# Patient Record
Sex: MALE | Race: BLACK OR AFRICAN AMERICAN
[De-identification: names, ages, dates, MRNs, and addresses within clinical notes are randomized per-mention and may not be internally consistent; named-entity substitution may affect disease eponyms.]

---

## 2017-10-07 NOTE — ED
Bk GOLD Nikita, scribed for Levon Villareal MD on 10/07/17 at 1348 .





HPI Cardiac





- HPI Summary


HPI Summary: 





This patient is a 35 year old M presenting to ED with a chief complaint of 

intense coughing since 1 week ago. The CC is described as increased in 

intensity since last week, productive clear sputum. The patient rates the pain 8

/10 in severity. Symptoms aggravated by nothing. Symptoms alleviated by 

nothing. Patient reports dry cough (10 years), diaphoresis, hemoptysis, 

coughing spasms, felt a pop on my left side of chest, dyspnea, SOB, wheezing, 

clear productive cough, sore throat, abdominal pain, and rashes. Patient denies 

fever and ear ache. Pt went to [facility] care 3 weeks ago. He received a call 

from them 1 week ago, explaining that he might have contracted whooping cough.





- History of Current Complaint


Chief Complaint: EDChestPainROMI


Stated Complaint: SHORT OF BREATH COUGH


Time Seen by Provider: 10/07/17 13:33


Hx Obtained From: Patient


Onset/Duration: Started Weeks Ago - 1 week ago, coughing became more intense, 

Still Present


Timing: Constant, Lasting Weeks


Initial Severity: Moderate


Current Severity: Moderate


Pain Intensity: 8


Pain Scale Used: 0-10 Numeric


Aggravating Factor(s): Nothing


Alleviating Factor(s): Nothing


Associated Signs and Symptoms: Positive: Other: - intense coughing since 1 week 

ago. . Patient reports dry cough (10 years), diaphoresis, hemoptysis, coughing 

spasms, felt a pop on my left side of chest, dyspnea, SOB, wheezing, clear 

productive cough, sore throat, abdominal pain, and rashes. Patient denies fever 

and ear ache.





- Allergy/Home Medications


Allergies/Adverse Reactions: 


 Allergies











Allergy/AdvReac Type Severity Reaction Status Date / Time


 


No Known Drug Allergy Allergy  Unknown Verified 10/07/17 14:50





   Reaction  





   Details  


 


Environmental Allergy  Difficulty Uncoded 10/07/17 14:51





   Breathing/Wheezing  














PMH/Surg Hx/FS Hx/Imm Hx


Endocrine/Hematology History: 


   Denies: Hx Diabetes


Cardiovascular History: 


   Denies: Hx Coronary Artery Disease, Hx Hypertension


Infectious Disease History: No


Infectious Disease History: 


   Denies: Traveled Outside the [place] in Last 30 Days





- Family History


Known Family History: Positive: Hypertension


   Negative: Cardiac Disease, Diabetes





- Social History


Alcohol Use: None


Substance Use Type: Reports: None


Smoking Status (MU): Never Smoked Tobacco





Review of Systems


Positive: Chills, Skin Diaphoresis.  Negative: Fever


Positive: Sore Throat, Other.  Negative: Ear Ache


Positive: Shortness Of Breath, Cough - dry cough for 10 years; clear, 

productive cough since last week, coughing spasms, felt a pop on my left side 

of chest, Other - wheezing, dyspnea


Negative: Abdominal Pain


Negative: Rash


All Other Systems Reviewed And Are Negative: Yes





Physical Exam


Triage Information Reviewed: Yes


Vital Signs On Initial Exam: 


 Initial Vitals











Temp Pulse Resp BP Pulse Ox


 


 98.6 F   82   18   130/87   98 


 


 10/07/17 11:24  10/07/17 11:24  10/07/17 11:24  10/07/17 11:24  10/07/17 11:24











Vital Signs Reviewed: Yes


Appearance: Positive: No Pain Distress, Ill-Appearing - mild


Skin: Positive: Warm, Skin Color Reflects Adequate Perfusion, Dry


Head/Face: Positive: Normal Head/Face Inspection


Eyes: Positive: EOMI, AICHA


ENT: Positive: Other - cerumen impaction bilaterally in the ears


Neck: Positive: Supple, Nontender


Respiratory/Lung Sounds: Positive: Clear to Auscultation, Breath Sounds Present


Cardiovascular: Positive: RRR


Abdomen Description: Positive: Nontender, Soft


Bowel Sounds: Positive: Present


Musculoskeletal: Positive: Normal, Strength/ROM Intact


Neurological: Positive: Normal, Sensory/Motor Intact, Alert, Oriented to Person 

Place, Time


Psychiatric: Positive: Affect/Mood Appropriate





- Stuart Coma Scale


Coma Scale Total: 15





Diagnostics





- Vital Signs


 Vital Signs











  Temp Pulse Resp BP Pulse Ox


 


 10/07/17 13:00   77   133/89  96


 


 10/07/17 12:52   80    98


 


 10/07/17 12:51     137/109 


 


 10/07/17 12:27  98.7 F  77  22  131/78  100


 


 10/07/17 11:24  98.6 F  82  18  130/87  98














- Laboratory


Lab Results: 


 Lab Results











  10/07/17 10/07/17 10/07/17 Range/Units





  14:03 14:03 14:03 


 


WBC     (3.5-10.8)  10^3/ul


 


RBC     (4.0-5.4)  10^6/ul


 


Hgb     (14.0-18.0)  g/dl


 


Hct     (42-52)  %


 


MCV     (80-94)  fL


 


MCH     (27-31)  pg


 


MCHC     (31-36)  g/dl


 


RDW     (10.5-15)  %


 


Plt Count     (150-450)  10^3/ul


 


MPV     (7.4-10.4)  um3


 


Neut % (Auto)     (38-83)  %


 


Lymph % (Auto)     (25-47)  %


 


Mono % (Auto)     (1-9)  %


 


Eos % (Auto)     (0-6)  %


 


Baso % (Auto)     (0-2)  %


 


Absolute Neuts (auto)     (1.5-7.7)  10^3/ul


 


Absolute Lymphs (auto)     (1.0-4.8)  10^3/ul


 


Absolute Monos (auto)     (0-0.8)  10^3/ul


 


Absolute Eos (auto)     (0-0.6)  10^3/ul


 


Absolute Basos (auto)     (0-0.2)  10^3/ul


 


Absolute Nucleated RBC     10^3/ul


 


Nucleated RBC %     


 


INR (Anticoag Therapy)  0.97    (0.89-1.11)  


 


APTT  23.6 L    (26.0-36.3)  seconds


 


D-Dimer, Quantitative  < 200    (Less Than 230)  ng/mL


 


Sodium    138  (133-145)  mmol/L


 


Potassium    3.5  (3.5-5.0)  mmol/L


 


Chloride    104  (101-111)  mmol/L


 


Carbon Dioxide    26  (22-32)  mmol/L


 


Anion Gap    8  (2-11)  mmol/L


 


BUN    11  (6-24)  mg/dL


 


Creatinine    1.11  (0.67-1.17)  mg/dL


 


Est GFR ( Amer)    98.7  (>60)  


 


Est GFR (Non-Af Amer)    76.8  (>60)  


 


BUN/Creatinine Ratio    9.9  (8-20)  


 


Glucose    87  ()  mg/dL


 


Lactic Acid     (0.5-2.0)  mmol/L


 


Calcium    9.0  (8.6-10.3)  mg/dL


 


Magnesium    2.1  (1.9-2.7)  mg/dL


 


Total Bilirubin    0.50  (0.2-1.0)  mg/dL


 


AST    18  (13-39)  U/L


 


ALT    23  (7-52)  U/L


 


Alkaline Phosphatase    46  ()  U/L


 


Troponin I    0.01  (<0.04)  ng/mL


 


C-Reactive Protein    7.00 H  (< 5.00)  mg/L


 


B-Natriuretic Peptide   34  ( - 100) pg/mL


 


Total Protein    7.1  (6.4-8.9)  g/dL


 


Albumin    4.0  (3.2-5.2)  g/dL


 


Globulin    3.1  (2-4)  g/dL


 


Albumin/Globulin Ratio    1.3  (1-3)  


 


Lipase    27  (11.0-82.0)  U/L


 


TSH    1.25  (0.34-5.60)  mcIU/mL


 


Urine Color     


 


Urine Appearance     


 


Urine pH     (5-9)  


 


Ur Specific Gravity     (1.010-1.030)  


 


Urine Protein     (Negative)  


 


Urine Ketones     (Negative)  


 


Urine Blood     (Negative)  


 


Urine Nitrate     (Negative)  


 


Urine Bilirubin     (Negative)  


 


Urine Urobilinogen     (Negative)  


 


Ur Leukocyte Esterase     (Negative)  


 


Urine WBC (Auto)     (Absent)  


 


Urine RBC (Auto)     (Absent)  


 


Ur Squamous Epith Cells     (Absent)  


 


Urine Bacteria     (Absent)  


 


Urine Glucose     (Negative)  


 


Urine Ascorbic Acid     (Negative)  














  10/07/17 10/07/17 10/07/17 Range/Units





  14:03 14:03 14:23 


 


WBC  14.0 H    (3.5-10.8)  10^3/ul


 


RBC  4.53    (4.0-5.4)  10^6/ul


 


Hgb  13.6 L    (14.0-18.0)  g/dl


 


Hct  40 L    (42-52)  %


 


MCV  88    (80-94)  fL


 


MCH  30    (27-31)  pg


 


MCHC  34    (31-36)  g/dl


 


RDW  14    (10.5-15)  %


 


Plt Count  280    (150-450)  10^3/ul


 


MPV  8    (7.4-10.4)  um3


 


Neut % (Auto)  56.9    (38-83)  %


 


Lymph % (Auto)  32.8    (25-47)  %


 


Mono % (Auto)  8.8    (1-9)  %


 


Eos % (Auto)  0.3    (0-6)  %


 


Baso % (Auto)  1.2    (0-2)  %


 


Absolute Neuts (auto)  8.0 H    (1.5-7.7)  10^3/ul


 


Absolute Lymphs (auto)  4.6    (1.0-4.8)  10^3/ul


 


Absolute Monos (auto)  1.2 H    (0-0.8)  10^3/ul


 


Absolute Eos (auto)  0    (0-0.6)  10^3/ul


 


Absolute Basos (auto)  0.2    (0-0.2)  10^3/ul


 


Absolute Nucleated RBC  0.02    10^3/ul


 


Nucleated RBC %  0.1    


 


INR (Anticoag Therapy)     (0.89-1.11)  


 


APTT     (26.0-36.3)  seconds


 


D-Dimer, Quantitative     (Less Than 230)  ng/mL


 


Sodium     (133-145)  mmol/L


 


Potassium     (3.5-5.0)  mmol/L


 


Chloride     (101-111)  mmol/L


 


Carbon Dioxide     (22-32)  mmol/L


 


Anion Gap     (2-11)  mmol/L


 


BUN     (6-24)  mg/dL


 


Creatinine     (0.67-1.17)  mg/dL


 


Est GFR ( Amer)     (>60)  


 


Est GFR (Non-Af Amer)     (>60)  


 


BUN/Creatinine Ratio     (8-20)  


 


Glucose     ()  mg/dL


 


Lactic Acid   1.0   (0.5-2.0)  mmol/L


 


Calcium     (8.6-10.3)  mg/dL


 


Magnesium     (1.9-2.7)  mg/dL


 


Total Bilirubin     (0.2-1.0)  mg/dL


 


AST     (13-39)  U/L


 


ALT     (7-52)  U/L


 


Alkaline Phosphatase     ()  U/L


 


Troponin I     (<0.04)  ng/mL


 


C-Reactive Protein     (< 5.00)  mg/L


 


B-Natriuretic Peptide    ( - 100) pg/mL


 


Total Protein     (6.4-8.9)  g/dL


 


Albumin     (3.2-5.2)  g/dL


 


Globulin     (2-4)  g/dL


 


Albumin/Globulin Ratio     (1-3)  


 


Lipase     (11.0-82.0)  U/L


 


TSH     (0.34-5.60)  mcIU/mL


 


Urine Color    Yellow  


 


Urine Appearance    Cloudy  


 


Urine pH    5.0  (5-9)  


 


Ur Specific Gravity    1.036 H  (1.010-1.030)  


 


Urine Protein    1+(30 mg/dl) H  (Negative)  


 


Urine Ketones    Trace H  (Negative)  


 


Urine Blood    Negative  (Negative)  


 


Urine Nitrate    Negative  (Negative)  


 


Urine Bilirubin    Negative  (Negative)  


 


Urine Urobilinogen    Negative  (Negative)  


 


Ur Leukocyte Esterase    Negative  (Negative)  


 


Urine WBC (Auto)    Absent  (Absent)  


 


Urine RBC (Auto)    Absent  (Absent)  


 


Ur Squamous Epith Cells    Present H  (Absent)  


 


Urine Bacteria    Absent  (Absent)  


 


Urine Glucose    Negative  (Negative)  


 


Urine Ascorbic Acid    * H  (Negative)  











Result Diagrams: 


 10/07/17 14:03





 10/07/17 14:03


Lab Statement: Any lab studies that have been ordered have been reviewed, and 

results considered in the medical decision making process.





- Radiology


  ** CXR


Radiology Interpretation Completed By: Radiologist - NO ACTIVE CARDIOPULMONARY 

DISEASE. ED physician has reviewed this radiology report and agrees.





Disposition





- Course


Assessment/Plan: This patient is a 35 year old M presenting to ED with a chief 

complaint of intense coughing since 1 week ago. The CC is described as 

increased in intensity since last week, productive clear sputum. The patient 

rates the pain 8/10 in severity. Symptoms aggravated by nothing. Symptoms 

alleviated by nothing. Patient reports dry cough (10 years), diaphoresis, 

hemoptysis, coughing spasms, felt a pop on my left side of chest, dyspnea, SOB

, wheezing, clear productive cough, and sore throat. Patient denies fever, ear 

ache, abdominal pain, and rashes. CXR reveals NO ACTIVE CARDIOPULMONARY 

DISEASE. ED physician has reviewed this radiology report and agrees. In the ED 

course, pt was given fluids and pain medication. Medications reviewed. BP noted 

and advised to follow up with PCP.  TREAT WITH ZPAC.  F/U PMD.  IMPROVED IN ED.

  NO CRITICAL CARE TIME.





- Diagnoses


Provider Diagnoses: 


 Bronchospasm with bronchitis, acute, Pertussis exposure








Discharge





- Discharge Plan


Condition: Stable


Disposition: HOME


Patient Education Materials:  Acute Bronchitis (ED), Bronchospasm (ED)


Referrals: 


No Primary Care Phys,NOPCP [Primary Care Provider] - 


Additional Instructions: 


FOLLOW UP WITH YOUR DOCTOR FOR THE BRONCHITIS AND EXPOSURE TO PERTUSSIS.


AVOID CONTACT WITH OTHER PEOPLE UNTIL YOUR PERTUSSIS SWAB RESULTS HAVE RETURNED 

AND YOU HAVE BEEN ON THE ANTIBIOTIC FOR 5 DAYS.


RETURN TO THE EMERGENCY DEPARTMENT FOR ANY WORSENING OF YOUR CONDITION OR 

QUESTIONS OR CONCERNS.





The documentation as recorded by the Bk hendrix Nikita accurately reflects the 

service I personally performed and the decisions made by me, Levon Villareal MD.

## 2017-10-07 NOTE — RAD
HISTORY: , Left lower chest pain, cough



COMPARISONS: None



VIEWS: 4: Frontal dual-energy and lateral views of the chest.



FINDINGS:

CARDIOMEDIASTINAL SILHOUETTE: The cardiomediastinal silhouette is normal.

ALLEN: The allen are normal.

PLEURA: The costophrenic angles are sharp. No pleural abnormalities are noted.

LUNG PARENCHYMA: The lungs are clear.

ABDOMEN: The upper abdomen is clear. There is no subphrenic gas.

BONES AND SOFT TISSUES: No bone or soft tissue abnormalities are noted.

OTHER: None.



IMPRESSION:

NO ACTIVE CARDIOPULMONARY DISEASE.

## 2017-12-01 NOTE — HP
CC:  [facility] *

 

PREOPERATIVE HISTORY AND PHYSICAL:

 

DATE OF ADMISSION:  12/07/17

 

This patient is scheduled for same-day surgery admission by Dr. Mclean on 
Thursday, 12/07/17.

 

DATE OF EXAM:  Friday, 12/01/17.

 

ATTENDING SURGEON:  Dr. Carlito Mclean * (dictated by Gisselle Shah NP).

 

CHIEF COMPLAINT:  Gastroesophageal reflux disease.

 

HISTORY OF PRESENT ILLNESS:  The patient is a 32-year-old male referred from 
[facility] with gastroesophageal reflux disease.  He has 
suffered with this for about 15 years despite being on various types of 
medications.  It has affected his dentition.  He is chronically hoarse and has 
chronic asthma.  He has difficulty sleeping through the night.  He awakens with 
reflux in his mouth and throat with a bitter taste and even when he has acid 
suppression, he ends up with alkaline reflux that affects his vocal cords.  He 
cannot skip any dose of his acid reducers.  He underwent upper endoscopy, which 
revealed mild diffuse gastritis and irregular Z-line, otherwise a normal 
esophagus.  Upper GI series revealed a moderate amount of free gastroesophageal 
reflux.  Dr. Mclean examined the patient and reviewed the findings and has 
recommended laparoscopic Nissen fundoplication. He described the nature of the 
surgical procedure, the relevant risks and benefits and today, I reviewed the 
expected postoperative care and recovery including postoperative dietary 
guidelines.  The patient has had a chance to ask questions and stated that he 
understands the information and is satisfied with the answers given to his 
questions.  He will sign surgical consent on the day of surgery.

 

PAST MEDICAL HISTORY:  Significant for asthma.

 

PAST SURGICAL HISTORY:  Rhinoplasty in 2014, corneal transplant in 2016.

 

MEDICATIONS:

1.  Montelukast, dose unspecified, daily in the morning.

2.  Pantoprazole, dose unspecified, daily in the morning.

3.  Zantac 150 mg at bedtime.

4.  Allegra 60 mg daily in the morning.

5.  QNASL 2 puffs in the morning.

6.  Spiriva 18 mcg 1 unit inhalation daily.

7.  Dulera 100/5 mcg per actuation 2 puffs b.i.d.

8.  Prednisolone eye drops 1 drop 4 times a day and he did not specify if it 
was in both eyes.

 

ALLERGIES:  No known drug allergies.  No food or latex allergies.  He does 
report ENVIRONMENTAL ALLERGIES.

 

FAMILY HISTORY:  No known history of deep vein thrombosis, pulmonary embolism, 
or bleeding tendencies.  No known history of anesthesia complications.

 

SOCIAL HISTORY:  He is  and is employed in IT at [employer].  He 
has never been a smoker.  He drinks alcohol socially.

 

REVIEW OF SYSTEMS:  Constitutional:  No fever or chills, excessive fatigue or 
weight loss.  Endocrine:  No diabetes or thyroid disease.  Hematologic:  No 
easy bruising or bleeding.  Respiratory:  Chronic cough related to acid reflux. 
Cardiovascular:  No anginal chest pain or palpitations.  Gastrointestinal:  As 
described in history of present illness.  Occasional diarrhea.  No 
constipation. No change in bowel habits.  Genitourinary:  No dysuria.  
Musculoskeletal:  No chronic back or joint pain.  Neurologic:  No headache, 
blurred vision, areas of focal weakness or numbness.  General:  No history of 
deep vein thrombosis or pulmonary embolism.  No bleeding tendencies.  No 
previous blood transfusions.  No previous anesthesia complications, but the 
patient states with local anesthesia and conscious sedation, he has required 
more than the usual dosages.

 

                               PHYSICAL EXAMINATION

 

GENERAL SURVEY:  The patient is a 32-year-old male, obese, well developed, in 
no acute distress.

 

VITAL SIGNS:  Height 64 inches, weight 240 pounds, body mass index 41.2.  Blood 
pressure 120/84, pulse 68 and regular, respiratory rate 18, temperature 97.4.

 

HEENT:  Benign.

 

NECK:  Supple.  No cervical lymphadenopathy.  No thyromegaly.

 

LUNGS:  Breath sounds bilaterally clear and equal.

 

BACK:  No CVA tenderness.

 

HEART:  Regular rate and rhythm.  No murmurs or rubs appreciated.

 

ABDOMEN:  Obese, soft, nondistended.  Obvious umbilical hernia that is mildly 
tender and reducible.  No other obvious masses or organomegaly.

 

EXTREMITIES:  Warm without edema or skin ulcerations.

 

GENITALIA:  Exam deferred.

 

RECTAL:  Exam deferred.

 

NEUROLOGIC:  Alert and oriented x3, steady gait.

 

SKIN:  Warm, dry, and intact.

 

 IMPRESSION:  Gastroesophageal reflux disease without esophagitis.

 

PLAN:  Same-day surgery admission to Dr. Mclean's service on Thursday, 12/07/17 
for laparoscopic Nissen fundoplication.

 

 ____________________________________ GEMA SHAH NP

 

[id number]/CPS #: [id number]

[facility]

## 2017-12-07 NOTE — PN
Progress Note





- Progress Note


Date of Service: 12/07/17


Note: 





Brief Operative Note:





Preop Dx: GERD with hiatal hernia; umbilical hernia


Postop Dx: same


Procedure: Laparoscopic Nissen fundoplication; open repair umbilical hernia


Anesthesia: GET


Surgeon: Caridad


Asst: LANI Salcido


Fluids: 1700 ml RL


EBL: 30 ml


Drains: none


Specimen: none


Findings: dictated

## 2017-12-07 NOTE — OP
CC:  [facility] *

 

DATE OF OPERATION:  12/07/17 - SD

 

DATE OF BIRTH:  [birth year]

 

SURGEON:  Carlito Mclean MD

 

ASSISTANT:  LANI oLve

 

ANESTHESIOLOGIST:  Breanna Arguello MD

 

ANESTHESIA:  General anesthetic, local infiltration.

 

PRE-OP DIAGNOSIS:  Gastroesophageal reflux disease.

 

POST-OP DIAGNOSES:  Gastroesophageal reflux disease with umbilical hernia.

 

OPERATIVE PROCEDURE:  Laparoscopic Nissen fundoplication with repair of 
umbilical hernia.

 

DESCRIPTION OF PROCEDURE:  The patient was supine on the operating room table. 
After adequate general anesthetic, compression stockings, Kristin Hugger warmer, 
and intravenous antibiotics, the abdomen was prepped with antiseptic, draped in 
a sterile fashion.  Local infiltrative anesthesia was administered at all these 
sites.  He was on the split leg table, appropriately padded and positioned and 
secured to the table.  Curvilinear supraumbilical incision was created and 
umbilical hernia was identified.  This was about 2 cm at the defect and maybe 3 
cm for the hernia size.  It was dissected free and reduced and a cannula was 
placed in the peritoneal space.  Insufflation was carried out with carbon 
dioxide. Additional cannulae, 5-mm right upper quadrant, subxiphoid, and left 
anterior axillary line, and 12 mm left subcostal, were placed through small 
stab wounds under direct vision.  The liver was tented upward, the 
gastrohepatic omentum was entered using the LigaSure device, and dissection 
carried up to the right crura. The mediastinum was entered at this location and 
paraesophageal dissection was carried out in the usual fashion.  The left crura 
was also freed up from its attachments and a Penrose drain was placed through 
the retroesophageal window and used for retraction.  Once the esophagus had 
been well mobilized and the GE junction brought well down to the abdomen, the 
crura was closed retroesophageally using two sutures of 0 Ethibond.  This was 
done around a 56-Mexican Bougie and was not too tight.  Wrap was created in the 
usual fashion and was done with 2 sutures of 0 Ethibond.  It was also sutured 
to the esophagus to prevent slippage.  This was also not too tight around a 56-
Mexican bougie.  Hemostasis was excellent.  The cannulae were removed.  
Pneumoperitoneum was allowed to escape.  The umbilical hernia repair was 
carried up by freeing up the fascial edges and closing them in a continuous 
running suture of 0 Vicryl.  The adipose was reapproximated with 3-0 Vicryl and 
skin in all cases with 5-0 Vicryl, followed by Steri-Strips.  He tolerated the 
procedure well, was awakened, and brought to Recovery in good condition.  No 
complications. No drains. No pathologic specimens.  Sponge and instrument 
counts correct.  Estimated blood loss was 30 mL.

 

 [id number]/CPS #: [id number]

[facility]

## 2019-06-27 ENCOUNTER — HOSPITAL ENCOUNTER (EMERGENCY)
Dept: HOSPITAL 25 - UCEAST | Age: 34
Discharge: HOME | End: 2019-06-27
Payer: COMMERCIAL

## 2019-06-27 VITALS — DIASTOLIC BLOOD PRESSURE: 99 MMHG | SYSTOLIC BLOOD PRESSURE: 124 MMHG

## 2019-06-27 DIAGNOSIS — S61.012A: Primary | ICD-10-CM

## 2019-06-27 DIAGNOSIS — F41.9: ICD-10-CM
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
## 2019-06-27 DIAGNOSIS — K21.9: ICD-10-CM
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
## 2019-06-27 PROCEDURE — 12001 RPR S/N/AX/GEN/TRNK 2.5CM/<: CPT

## 2019-06-27 PROCEDURE — G0463 HOSPITAL OUTPT CLINIC VISIT: HCPCS

## 2019-06-27 PROCEDURE — 99211 OFF/OP EST MAY X REQ PHY/QHP: CPT

## 2019-06-27 NOTE — UC
Laceration HPI





- HPI Summary


HPI Summary: 





35 yo male presents with LEFT thumb laceration. He tells me that last night he 

was trimming his bushes and slipped sustaining a laceration to his left thumb. 

He cleansed the area well with water and soap. He bandaged the area and 

controlled the bleeding. Today he looked at it and is concerned he may need 

stitches. Last tetanus was within the last 3 days. 





- History Of Current Complaint


Chief Complaint: UCLaceration


Stated Complaint: FINGER LACERATION


Time Seen by Provider: 06/27/19 17:43


Hx Obtained From: Patient


Laceration Location: Finger


Mechanism Of Injury: Sharp Trauma


Onset/Duration: Sudden Onset


Pain Intensity: 0


Pain Scale Used: 0-10 Numeric





- Allergies/Home Medications


Allergies/Adverse Reactions: 


 Allergies











Allergy/AdvReac Type Severity Reaction Status Date / Time


 


Environmental Allergy Intermediate Difficulty Uncoded 06/27/19 17:37





   Breathing/Wheezing  











Home Medications: 


 Home Medications





Amitriptyline TAB* [Elavil TAB*]  06/27/19 [History Confirmed 06/27/19]











PMH/Surg Hx/FS Hx/Imm Hx


Cardiovascular History: Hypertension


Respiratory History: Asthma


GI/ History: Gastroesophageal Reflux


Psychological History: Anxiety





- Surgical History


Surgical History: Yes


Surgery Procedure, Year, and Place: right eye transplant for rare cornea 

disease.  rhinoplasty, shaved turbinates.  ENDOSCOPY, NISSON FUNDALPLACATION 

FOR GERD





- Family History


Known Family History: Positive: Hypertension


   Negative: Cardiac Disease, Diabetes





- Social History


Occupation: Employed Full-time


Lives: With Family


Alcohol Use: Occasionally


Substance Use Type: None


Smoking Status (MU): Never Smoked Tobacco





- Immunization History


Most Recent Tetanus Shot: <5 YEARS





Review of Systems


All Other Systems Reviewed And Are Negative: Yes


Constitutional: Positive: Negative


Skin: Positive: Other - Left thumb laceration


Respiratory: Positive: Negative


Cardiovascular: Positive: Negative


Neurovascular: Positive: Negative


Neurological: Positive: Negative


Psychological: Positive: Negative





Physical Exam





- Summary


Physical Exam Summary: 





GENERAL: NAD. WDWN. No pain distress.


SKIN: LEFT THUMB: Distal tip with horizontal 1.0cm linear laceration just 

through the dermis with granulation tissue forming. Clean wound without drainage

, tenderness, edema, or FB.


CHEST:  No accessory muscle use. Breathing comfortably and in no distress.


CV:  Pulses intact. Cap refill <2seconds


NEURO: Alert.


PSYCH: Age appropriate behavior.





Triage Information Reviewed: Yes


Vital Signs: 


 Initial Vital Signs











Temp  97.6 F   06/27/19 17:33


 


Pulse  75   06/27/19 17:33


 


Resp  16   06/27/19 17:33


 


BP  124/99   06/27/19 17:33


 


Pulse Ox  100   06/27/19 17:33











Vital Signs Reviewed: Yes





Laceration Repair





- Laceration Repair


  ** 1


Description: Linear


Laceration Size After Repair: Length (cm) - 1.0


Closure Material: Skin Adhesive


Closure Method: Single Layer





Laceration Course/Dx





- Course/Dx


Course Of Treatment: 





Not amenable to sutures. The area was brought into closer approximation and the 

site was glued with dermabond and band-aid applied. Advised to change the 

bandaid daily until well healed.





- Diagnosis


Provider Diagnosis: 


 Laceration of thumb








Discharge





- Sign-Out/Discharge


Documenting (check all that apply): Patient Departure


All imaging exams completed and their final reports reviewed: No Studies





- Discharge Plan


Condition: Stable


Disposition: HOME


Patient Education Materials:  Laceration (DC), Skin Adhesive Care (ED)


Referrals: 


No Primary Care Phys,NOPCP [Primary Care Provider] - 


Additional Instructions: 


If you develop a fever, shortness of breath, chest pain, new or worsening 

symptoms - please call your PCP or go to the ED immediately.


 


Change the band-aid daily until well healed (likely 4-5 days)





- Billing Disposition and Condition


Condition: STABLE


Disposition: Home